# Patient Record
Sex: FEMALE | Race: WHITE | NOT HISPANIC OR LATINO | URBAN - METROPOLITAN AREA
[De-identification: names, ages, dates, MRNs, and addresses within clinical notes are randomized per-mention and may not be internally consistent; named-entity substitution may affect disease eponyms.]

---

## 2023-02-20 ENCOUNTER — EMERGENCY (EMERGENCY)
Facility: HOSPITAL | Age: 78
LOS: 1 days | Discharge: HOME | End: 2023-02-20
Attending: STUDENT IN AN ORGANIZED HEALTH CARE EDUCATION/TRAINING PROGRAM | Admitting: STUDENT IN AN ORGANIZED HEALTH CARE EDUCATION/TRAINING PROGRAM
Payer: MEDICARE

## 2023-02-20 VITALS
DIASTOLIC BLOOD PRESSURE: 74 MMHG | RESPIRATION RATE: 16 BRPM | HEART RATE: 70 BPM | SYSTOLIC BLOOD PRESSURE: 135 MMHG | TEMPERATURE: 98 F | OXYGEN SATURATION: 96 %

## 2023-02-20 VITALS
HEART RATE: 57 BPM | DIASTOLIC BLOOD PRESSURE: 75 MMHG | RESPIRATION RATE: 16 BRPM | HEIGHT: 63 IN | SYSTOLIC BLOOD PRESSURE: 121 MMHG | WEIGHT: 119.93 LBS | OXYGEN SATURATION: 95 % | TEMPERATURE: 98 F
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
## 2023-02-20 DIAGNOSIS — Z23 ENCOUNTER FOR IMMUNIZATION: ICD-10-CM

## 2023-02-20 DIAGNOSIS — S01.81XA LACERATION WITHOUT FOREIGN BODY OF OTHER PART OF HEAD, INITIAL ENCOUNTER: ICD-10-CM

## 2023-02-20 DIAGNOSIS — I10 ESSENTIAL (PRIMARY) HYPERTENSION: ICD-10-CM

## 2023-02-20 DIAGNOSIS — S42.252A DISPLACED FRACTURE OF GREATER TUBEROSITY OF LEFT HUMERUS, INITIAL ENCOUNTER FOR CLOSED FRACTURE: ICD-10-CM

## 2023-02-20 DIAGNOSIS — M25.512 PAIN IN LEFT SHOULDER: ICD-10-CM

## 2023-02-20 PROCEDURE — 90715 TDAP VACCINE 7 YRS/> IM: CPT

## 2023-02-20 PROCEDURE — 73060 X-RAY EXAM OF HUMERUS: CPT | Mod: 26,LT

## 2023-02-20 PROCEDURE — 99285 EMERGENCY DEPT VISIT HI MDM: CPT | Mod: 25

## 2023-02-20 PROCEDURE — 73200 CT UPPER EXTREMITY W/O DYE: CPT | Mod: MA

## 2023-02-20 PROCEDURE — 70450 CT HEAD/BRAIN W/O DYE: CPT | Mod: 26,MA

## 2023-02-20 PROCEDURE — 73200 CT UPPER EXTREMITY W/O DYE: CPT | Mod: 26,LT,MA

## 2023-02-20 PROCEDURE — 73060 X-RAY EXAM OF HUMERUS: CPT

## 2023-02-20 PROCEDURE — 73030 X-RAY EXAM OF SHOULDER: CPT

## 2023-02-20 PROCEDURE — 12013 RPR F/E/E/N/L/M 2.6-5.0 CM: CPT

## 2023-02-20 PROCEDURE — 71045 X-RAY EXAM CHEST 1 VIEW: CPT | Mod: 26

## 2023-02-20 PROCEDURE — 73030 X-RAY EXAM OF SHOULDER: CPT | Mod: 26

## 2023-02-20 PROCEDURE — 90471 IMMUNIZATION ADMIN: CPT

## 2023-02-20 PROCEDURE — 70450 CT HEAD/BRAIN W/O DYE: CPT | Mod: MA

## 2023-02-20 PROCEDURE — 71045 X-RAY EXAM CHEST 1 VIEW: CPT

## 2023-02-20 RX ORDER — OXYCODONE HYDROCHLORIDE 5 MG/1
5 TABLET ORAL ONCE
Refills: 0 | Status: DISCONTINUED | OUTPATIENT
Start: 2023-02-20 | End: 2023-02-20

## 2023-02-20 RX ORDER — OXYCODONE HYDROCHLORIDE 5 MG/1
1 TABLET ORAL
Qty: 9 | Refills: 0
Start: 2023-02-20

## 2023-02-20 RX ORDER — IBUPROFEN 200 MG
600 TABLET ORAL ONCE
Refills: 0 | Status: COMPLETED | OUTPATIENT
Start: 2023-02-20 | End: 2023-02-20

## 2023-02-20 RX ORDER — IBUPROFEN 200 MG
1 TABLET ORAL
Qty: 15 | Refills: 0
Start: 2023-02-20

## 2023-02-20 RX ORDER — TETANUS TOXOID, REDUCED DIPHTHERIA TOXOID AND ACELLULAR PERTUSSIS VACCINE, ADSORBED 5; 2.5; 8; 8; 2.5 [IU]/.5ML; [IU]/.5ML; UG/.5ML; UG/.5ML; UG/.5ML
0.5 SUSPENSION INTRAMUSCULAR ONCE
Refills: 0 | Status: COMPLETED | OUTPATIENT
Start: 2023-02-20 | End: 2023-02-20

## 2023-02-20 RX ADMIN — Medication 600 MILLIGRAM(S): at 09:41

## 2023-02-20 RX ADMIN — Medication 600 MILLIGRAM(S): at 10:40

## 2023-02-20 RX ADMIN — OXYCODONE HYDROCHLORIDE 5 MILLIGRAM(S): 5 TABLET ORAL at 16:05

## 2023-02-20 RX ADMIN — TETANUS TOXOID, REDUCED DIPHTHERIA TOXOID AND ACELLULAR PERTUSSIS VACCINE, ADSORBED 0.5 MILLILITER(S): 5; 2.5; 8; 8; 2.5 SUSPENSION INTRAMUSCULAR at 09:40

## 2023-02-20 RX ADMIN — OXYCODONE HYDROCHLORIDE 5 MILLIGRAM(S): 5 TABLET ORAL at 13:22

## 2023-02-20 NOTE — ED PROVIDER NOTE - NSFOLLOWUPINSTRUCTIONS_ED_ALL_ED_FT
You received FOUR stitches on your chin. These sutures need to be removed in FIVE days. Please return to the ER for suture removal. Alternatively, you can also see your primary care doctor to have the sutures removed.     See attached information about your arm fracture.     Arm Fracture in Adults    WHAT YOU NEED TO KNOW:    What is an arm fracture? An arm fracture is a crack or break in one or more of the bones in your arm.   Adult Arm Bones         What are the different types of arm fractures?   •Nondisplaced means the bone cracked or broke but stayed in place.      •Displaced means the 2 ends of the broken bone .      •Comminuted means the bone cracked or broke into several pieces.      •Open means the broken bone went through your skin.      What are the signs and symptoms of an arm fracture?   •Arm and shoulder pain      •Swelling and bruising      •Abnormal arm position or shape      •Severe pain when you move your arm      •Weakness or numbness in your arm, hand, or fingers      How is an arm fracture diagnosed? Your healthcare provider will ask about your injury and examine you. An x-ray may show the type of fracture you have. You may need more than one x-ray, or another x-ray after several days have passed.    How is an arm fracture treated? Treatment will depend on what kind of fracture you have, and how bad it is. You may need any of the following:  •A support device, such as a brace, cast, or splint may be needed to hold your broken bones in place. It will decrease your arm movement and allow the bones to heal.       •NSAIDs, such as ibuprofen, help decrease swelling, pain, and fever. This medicine is available with or without a doctor's order. NSAIDs can cause stomach bleeding or kidney problems in certain people. If you take blood thinner medicine, always ask your healthcare provider if NSAIDs are safe for you. Always read the medicine label and follow directions.      •Acetaminophen decreases pain and fever. It is available without a doctor's order. Ask how much to take and how often to take it. Follow directions. Read the labels of all other medicines you are using to see if they also contain acetaminophen, or ask your doctor or pharmacist. Acetaminophen can cause liver damage if not taken correctly.      •Prescription pain medicine may be given. Ask your healthcare provider how to take this medicine safely. Some prescription pain medicines contain acetaminophen. Do not take other medicines that contain acetaminophen without talking to your healthcare provider. Too much acetaminophen may cause liver damage. Prescription pain medicine may cause constipation. Ask your healthcare provider how to prevent or treat constipation.       •Closed reduction may be done to put your bones back into the correct position without surgery.      •Open reduction surgery may be needed to put your bones back into the correct position. An incision is made and the bones are put back in the correct position. This may include the use of wires, pins, plates, or screws.       How can I manage my symptoms?   •Elevate your arm above the level of your heart as often as you can. This will help decrease swelling and pain. Prop your arm on pillows or blankets to keep it elevated comfortably.             •Apply ice on your arm for 15 to 20 minutes every hour or as directed. Use an ice pack, or put crushed ice in a plastic bag. Cover it with a towel. Ice helps prevent tissue damage and decreases swelling and pain.      •Rest your arm as much as possible. Ask your healthcare provider when you can put pressure or weight on your arm. Also ask when you can return to sports or exercise.       •Go to physical therapy as directed. A physical therapist teaches you exercises to help improve movement and strength, and to decrease pain.       When should I seek immediate care?   •The pain in your injured arm does not get better or gets worse, even after you rest and take medicine.      •Your injured arm, hand, or fingers feel numb.      •Your arm is swollen, red, and feels warm.      •Your skin over the fracture is swollen, cold, or pale.      •You cannot move your arm, hand, or fingers.       When should I call my doctor?   •You have a fever.      •Your brace or splint becomes wet, damaged, or comes off.      •You have questions or concerns about your injury, treatment, or care.      CARE AGREEMENT:    You have the right to help plan your care. Learn about your health condition and how it may be treated. Discuss treatment options with your healthcare providers to decide what care you want to receive. You always have the right to refuse treatment.

## 2023-02-20 NOTE — ED ADULT TRIAGE NOTE - NSSEPSISSUSPECTED_ED_A_ED
Pt laying on bed speaking to family members  Light and TV on  Will continue to monitor pt        Sofya Fowler  11/15/18 0575 No

## 2023-02-20 NOTE — ED PROVIDER NOTE - PATIENT PORTAL LINK FT
You can access the FollowMyHealth Patient Portal offered by [facility] by registering at the following website: [email or web address]. By joining [facility]’s FollowMyHealth portal, you will also be able to view your health information using other applications (apps) compatible with our system.

## 2023-02-20 NOTE — ED PROVIDER NOTE - CLINICAL SUMMARY MEDICAL DECISION MAKING FREE TEXT BOX
77-year-old female presenting for evaluation after mechanical   Trip and fall. On Exam, small laceration noted to the chin no active bleeding. Left shoulder deformity, but neurovascularly intact distally  in left upper extremity.  No other traumatic findings.   given patient's age will obtain head CT to assess for intracranial bleed.  Will update patient's tetanus vaccine.  Patient will need laceration repair.  Will obtain x-ray of the shoulder and left humerus to assess for dislocation versus fracture.  Reassess to dispo. Eula Ackerman, ED Attending 77-year-old female presenting for evaluation after mechanical   Trip and fall. On Exam, small laceration noted to the chin no active bleeding. Left shoulder deformity, but neurovascularly intact distally  in left upper extremity.  No other traumatic findings.   given patient's age will obtain head CT to assess for intracranial bleed.  Will update patient's tetanus vaccine.  Patient will need laceration repair.  Will obtain x-ray of the shoulder and left humerus to assess for dislocation versus fracture.  Reassess to dispo. Eula Ackerman ED Attending    Eula Ackerman, ANALI Attending: Patient clinically stable, updated on humerus fracture, chin repaired  nonabsorbable sutures.  No complications.  Patient seen by orthopedic team, CT performed, will give follow-up with Dr. Nam outpatient.  Patient and shoulder sling.  All questions answered.  Return precautions given and follow-up instructions provided.

## 2023-02-20 NOTE — ED ADULT NURSE NOTE - OBJECTIVE STATEMENT
77y F, A&ox3 hx of HTN, presents to ed c/o left shoulder pain and chin pain s/p trip and fall. Reports walking when tripped from standing height, landing onto left shoulder and chin. +jagged edge laceration to chin. no loc, no blood thinners. also c/o 7/10 left shoulder pain. Denies cp nor sob.

## 2023-02-20 NOTE — ED PROVIDER NOTE - CARE PROVIDER_API CALL
Dayday Nam)  Orthopaedic Surgery  [address], [address]  [place] [address]  Phone: [phone]  Fax: [phone]  Follow Up Time: 4-6 Days

## 2023-02-20 NOTE — ED ADULT TRIAGE NOTE - CHIEF COMPLAINT QUOTE
Pt presents to ED by EMS S/P mechanical trip and fall on street over uneven pavement. Pt hit chin and landed on L shoulder. C/O L shoulder pain and 1 inch lac to chin. EMS reports " The shoulder looks dislocated". Pt denies LOC. Neuro intact. Denies dizziness/ CP. Hx HTN. Denies blood thinners.

## 2023-02-20 NOTE — ED PROVIDER NOTE - OBJECTIVE STATEMENT
77-year-old female with past medical history of hypertension, no other chronic medical conditions presenting with chief complaint of left shoulder pain and shin pain after trip and fall on the sidewalk.  Patient denies any prodromal symptoms no LOC.  Not on any anticoagulation.  Patient states slowly due to the sidewalk being uneven.  Unknown last Tdap.  Denies any numbness or tingling in the left upper extremity.  No chest pain.  No other complaints at this time.

## 2023-02-20 NOTE — CONSULT NOTE ADULT - SUBJECTIVE AND OBJECTIVE BOX
Orthopaedic Surgery Consult Note    Attending Physician: Viv  Consult requested by: ED    CC: L arm pain    HPI:  77yFemale    Allergies    No Known Allergies    Intolerances      PAST MEDICAL & SURGICAL HISTORY:  HTN (hypertension)      No significant past surgical history          Meds:  oxyCODONE    IR 5 milliGRAM(s) Oral Once      Family History:  Denies family history of bleeding disorders    Social History:   Pt is a nonsmoker  Social EtOH use     Review of Systems:  All review of systems are negative except for those mentioned in HPI.    Physical Exam:  General: Pt Alert and oriented, NAD  L shoulder TTP, ecchymosis, skin intact  ROM testing deferred due to pain  Pulses: 2+ radial pulses, wwp, cap refill <3 seconds  Sensation: SILT axillary/radial/median/ulnar distributions  Motor: axillary/AIN/PIN/ulnar motors firing    Vital Signs Last 24 Hrs  T(C): 36.7 (20 Feb 2023 08:58), Max: 36.7 (20 Feb 2023 08:58)  T(F): 98.1 (20 Feb 2023 08:58), Max: 98.1 (20 Feb 2023 08:58)  HR: 68 (20 Feb 2023 12:14) (57 - 68)  BP: 151/74 (20 Feb 2023 12:14) (121/75 - 151/74)  BP(mean): --  RR: 16 (20 Feb 2023 12:14) (16 - 16)  SpO2: 95% (20 Feb 2023 12:14) (95% - 95%)    Parameters below as of 20 Feb 2023 12:14  Patient On (Oxygen Delivery Method): room air          Labs:              Imaging:   Xray  L shoulder (AP/lateral/axillary): displaced fracture of surgical neck with valgus impaction of shaft into humeral head    A/P: 77yFemale with L proximal humerus fx.  - stable  - pain control  - NWB LUE in sling  - obtain CT L shoulder for further evaluation  - f/u outpatient with Dr. Nam this week  - Discussed with Attending, Dr. Viv Swift, PGY-2  Ortho Pager [phone]

## 2024-10-01 ENCOUNTER — EMERGENCY (EMERGENCY)
Facility: HOSPITAL | Age: 79
LOS: 1 days | Discharge: HOME | End: 2024-10-01
Admitting: STUDENT IN AN ORGANIZED HEALTH CARE EDUCATION/TRAINING PROGRAM
Payer: MEDICARE

## 2024-10-01 VITALS
WEIGHT: 119.93 LBS | HEART RATE: 74 BPM | DIASTOLIC BLOOD PRESSURE: 88 MMHG | RESPIRATION RATE: 18 BRPM | HEIGHT: 64 IN | OXYGEN SATURATION: 95 % | TEMPERATURE: 98 F | SYSTOLIC BLOOD PRESSURE: 152 MMHG

## 2024-10-01 DIAGNOSIS — S01.511A LACERATION WITHOUT FOREIGN BODY OF LIP, INITIAL ENCOUNTER: ICD-10-CM

## 2024-10-01 DIAGNOSIS — I10 ESSENTIAL (PRIMARY) HYPERTENSION: ICD-10-CM
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
## 2024-10-01 DIAGNOSIS — S01.81XA LACERATION WITHOUT FOREIGN BODY OF OTHER PART OF HEAD, INITIAL ENCOUNTER: ICD-10-CM
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
## 2024-10-01 PROCEDURE — 73110 X-RAY EXAM OF WRIST: CPT | Mod: 26,RT

## 2024-10-01 PROCEDURE — 99284 EMERGENCY DEPT VISIT MOD MDM: CPT

## 2024-10-01 PROCEDURE — 70450 CT HEAD/BRAIN W/O DYE: CPT | Mod: 26,MC

## 2024-10-01 NOTE — ED ADULT TRIAGE NOTE - CHIEF COMPLAINT QUOTE
Pt. s/p mechanical trip and fall on sidewalk, witnessed, no LOC, no AC use. Pt. has mild swelling noted to her lips, bridge of the nose, and a small superficial laceration on her forehead. No injury to the inside of the mouth, teeth, and denies any HA, neuro s/s, or injury. Was ambulatory on scene.

## 2024-10-01 NOTE — ED PROVIDER NOTE - PROGRESS NOTE DETAILS
kareen - received on sign out pending CT results.   CT negative. stable for dc.     All results reviewed with the patient verbally. Discharge plan and return precautions d/w pt who verbalized understanding and agrees with plan. All questions answered. Vitals WNL. Ready for d/c.

## 2024-10-01 NOTE — ED PROVIDER NOTE - PATIENT PORTAL LINK FT
You can access the FollowMyHealth Patient Portal offered by [facility] by registering at the following website: [email or web address]. By joining [facility]’s FollowMyHealth portal, you will also be able to view your health information using other applications (apps) compatible with our system.

## 2024-10-01 NOTE — ED PROVIDER NOTE - CLINICAL SUMMARY MEDICAL DECISION MAKING FREE TEXT BOX
79 F [facility] htn p/w R wrist and head trauma s/p mechanical trip and fall.  no loc or use of AC.  on exam pt comfortable appearing, HEENT: nc, abrasion over nasal bridge, + 1cm laceration over mid forehead, no bleeding, no facial ttp, perrla, eomi, mmm, small laceration to inner upper lip, dentition intact, no e/o malocclusion; no midline spinal ttp, Ext: mild diffuse ttp over R wrist w/o deformity, no snuff box ttp, FROM throughout, no peripheral edema, SILT equal throughout, distal pulses 2+.  will obtain ct head to r/o ich, R wrist xray to r/o fx.  tetanus utd.      ct head no acute pathology.  xray wrist no fx or dislocation.  pt has her own plastic surgeon she contacted for forehead lac repair and declined repair in ED; steri strips placed after cleaning.  pt educated on RICE and discussed strict return parameters

## 2024-10-01 NOTE — ED PROVIDER NOTE - OBJECTIVE STATEMENT
79 F pm[facility] htn p/w R wrist and head trauma s/p mechanical trip and fall.  pt tripped on sidewalk and fell forward catching herself with R hand and hitting face on ground- no loc or use of AC.  pt able to ambulate w/o issue after.  tetanus utd.  denies f/c, HA, dizziness, vision changes, neck/back pain, chest pain, sob, nv, numbness/weakness, limited ROM ext, or other injuries

## 2024-10-01 NOTE — ED PROVIDER NOTE - NSFOLLOWUPINSTRUCTIONS_ED_ALL_ED_FT
You should have laceration repaired with your plastic surgeon as discussed     Take tylenol 650mg or motrin 400-800mg as needed every 4-6 hours for pain.   REST- Rest your hurting/injured joint or extremity to decrease pain and swelling for 24-48 hours    ICE- Apply ice to area of pain to decreased inflammation and pain, put towel/barrier between ice and skin. You can keep ice on for 20 minutes at a time 4-8 times daily   COMPRESSION- Wear ace wrap or brace for support to reduce swelling.  Make sure not to wrap too tight, loosen if skin feeling numb/tingling or skin turns blue   ELEVATION- Elevate hurting/injured area 6 or more inches about level of heart to decrease swelling/inflammation.  Use pillow under joint to elevate area    Closed Head Injury    A closed head injury is an injury to your head that may or may not involve a traumatic brain injury (TBI). Symptoms of TBI can be short or long lasting and include headache, dizziness, interference with memory or speech, fatigue, confusion, changes in sleep, mood changes, nausea, depression/anxiety, and dulling of senses. Make sure to obtain proper rest which includes getting plenty of sleep, avoiding excessive visual stimulation, and avoiding activities that may cause physical or mental stress. Avoid any situation where there is potential for another head injury, including sports.    SEEK IMMEDIATE MEDICAL CARE IF YOU HAVE ANY OF THE FOLLOWING SYMPTOMS: unusual drowsiness, vomiting, severe dizziness, seizures, lightheadedness, muscular weakness, different pupil sizes, visual changes, or clear or bloody discharge from your ears or nose.

## 2024-10-01 NOTE — ED PROVIDER NOTE - PHYSICAL EXAMINATION
Vitals reviewed  Gen: comfortable appearing at rest, in nad, speaking in full sentences  Skin: wwp, no rash/lesions  HEENT: nc, abrasion over nasal bridge, + 1cm laceration over mid forehead, no bleeding, no facial ttp, perrla, eomi, mmm, small laceration to inner upper lip, dentition intact, no e/o malocclusion  Neck/Back: no midline ttp/step off, no paraspinal ttp  CV: rrr, no audible m/r/g  Resp: symmetrical expansion, ctab, no w/r/r  Ext: mild diffuse ttp over R wrist w/o deformity, no snuff box ttp, FROM throughout, no peripheral edema, SILT equal throughout, distal pulses 2+  Neuro: alert/oriented x3, no focal deficits, steady gait without assistance

## 2024-10-01 NOTE — ED ADULT NURSE NOTE - OBJECTIVE STATEMENT
Patient to the ED c/o mechanical trip and fall while walking on uneven pavement, hitting head. -LOC, -AC, -n/v. C/O right wrist pain, no obvious deformity. Ambulatory, AAOX4, NAD.

## 2024-10-02 PROBLEM — I10 ESSENTIAL (PRIMARY) HYPERTENSION: Chronic | Status: ACTIVE | Noted: 2023-02-20

## 2024-11-20 PROCEDURE — 73110 X-RAY EXAM OF WRIST: CPT

## 2024-11-20 PROCEDURE — 70450 CT HEAD/BRAIN W/O DYE: CPT | Mod: MC

## 2024-11-20 PROCEDURE — 99284 EMERGENCY DEPT VISIT MOD MDM: CPT | Mod: 25
